# Patient Record
Sex: FEMALE | Race: WHITE | NOT HISPANIC OR LATINO | Employment: UNEMPLOYED | URBAN - METROPOLITAN AREA
[De-identification: names, ages, dates, MRNs, and addresses within clinical notes are randomized per-mention and may not be internally consistent; named-entity substitution may affect disease eponyms.]

---

## 2017-11-27 RX ORDER — OXYCODONE AND ACETAMINOPHEN 10; 325 MG/1; MG/1
1 TABLET ORAL EVERY 6 HOURS PRN
COMMUNITY
End: 2018-10-31

## 2017-11-27 RX ORDER — PAROXETINE HYDROCHLORIDE HEMIHYDRATE 37.5 MG/1
37.5 TABLET, FILM COATED, EXTENDED RELEASE ORAL EVERY MORNING
COMMUNITY
End: 2018-10-31

## 2017-11-27 RX ORDER — DIAZEPAM 5 MG/1
5 TABLET ORAL 2 TIMES DAILY PRN
COMMUNITY

## 2017-11-27 RX ORDER — CHOLECALCIFEROL (VITAMIN D3) 125 MCG
CAPSULE ORAL
COMMUNITY

## 2017-11-27 RX ORDER — FESOTERODINE FUMARATE 8 MG/1
TABLET, EXTENDED RELEASE ORAL
COMMUNITY
End: 2021-07-01

## 2017-11-28 ENCOUNTER — OFFICE VISIT (OUTPATIENT)
Dept: NEUROSURGERY | Facility: CLINIC | Age: 62
End: 2017-11-28

## 2017-11-28 VITALS — BODY MASS INDEX: 18.48 KG/M2 | WEIGHT: 115 LBS | TEMPERATURE: 98.7 F | HEIGHT: 66 IN

## 2017-11-28 DIAGNOSIS — M51.34 DEGENERATIVE DISC DISEASE, THORACIC: ICD-10-CM

## 2017-11-28 DIAGNOSIS — M51.36 DEGENERATIVE DISC DISEASE, LUMBAR: Primary | ICD-10-CM

## 2017-11-28 PROCEDURE — 99203 OFFICE O/P NEW LOW 30 MIN: CPT | Performed by: NEUROLOGICAL SURGERY

## 2017-11-28 NOTE — PROGRESS NOTES
Jessica Montoya  1955  [id number]      Chief Complaint   Patient presents with   • Lower back pain       HISTORY OF PRESENT ILLNESS:  She is a 62-year-old female who presents with a very complex neurological history.  She has a history of multiple sclerosis and has been under the care of Jun Dodson and Dr. Espino.  Concurrently she has a history of degenerative disc disease and chronic pain as a consequence.  I saw her approximately 10 years ago with a diagnosis of degenerative disc disease L5-S1 with a minor spondylolisthesis.  I've not seen her since that time.  Her pain has gotten progressively worse.  It radiates from her thoracic region distally into the lumbar area.  I am unable to elicit a history that would suggest nerve root or spinal cord compression.  She does have some issues with spasm related to her multiple sclerosis, however.    Concurrently she has lost considerable amount of weight, has become exceedingly deconditioned and increasingly depressed with suicidal ideation.  Because of the increase of her pain she is referred for neurosurgical consultation.  She has not been to pain management.     Past Medical History:   Diagnosis Date   • Anxiety    • Arthritis    • Ataxic gait    • Avitaminosis    • Carpal tunnel syndrome    • Depression    • Hyperlipidemia    • Low back pain    • Multiple falls    • Multiple sclerosis    • Neurogenic bladder    • Pyelonephritis    • Spastic gait    • Suicidal ideation    • Urinary incontinence        History reviewed. No pertinent surgical history.    Family History   Problem Relation Age of Onset   • Arthritis Other    • Hyperlipidemia Other    • Hypertension Other    • Depression Other    • Diabetes Other        Social History     Social History   • Marital status:      Spouse name: N/A   • Number of children: N/A   • Years of education: N/A     Occupational History   • Not on file.     Social History Main Topics   • Smoking status: Current Every Day  Smoker     Packs/day: 1.00     Types: Cigarettes   • Smokeless tobacco: Not on file   • Alcohol use Yes   • Drug use: No   • Sexual activity: Defer     Other Topics Concern   • Not on file     Social History Narrative       Allergies   Allergen Reactions   • Codeine          Current Outpatient Prescriptions:   •  Cholecalciferol (VITAMIN D3) 2000 units tablet, Take  by mouth., Disp: , Rfl:   •  diazePAM (VALIUM) 5 MG tablet, Take 5 mg by mouth 2 (Two) Times a Day As Needed for Anxiety., Disp: , Rfl:   •  fesoterodine fumarate (TOVIAZ ER) 8 MG tablet sustained-release 24 hour tablet, Take  by mouth Daily., Disp: , Rfl:   •  oxyCODONE-acetaminophen (PERCOCET)  MG per tablet, Take 1 tablet by mouth Every 6 (Six) Hours As Needed for Moderate Pain ., Disp: , Rfl:   •  PARoxetine CR (PAXIL-CR) 37.5 MG 24 hr tablet, Take 37.5 mg by mouth Every Morning., Disp: , Rfl:     Review of Systems   Constitutional: Positive for fatigue. Negative for activity change, appetite change, chills, diaphoresis, fever and unexpected weight change.   HENT: Negative for congestion, dental problem, drooling, ear discharge, ear pain, facial swelling, hearing loss, mouth sores, nosebleeds, postnasal drip, rhinorrhea, sinus pressure, sneezing, sore throat, tinnitus, trouble swallowing and voice change.    Eyes: Negative for photophobia, pain, discharge, redness, itching and visual disturbance.   Respiratory: Negative for apnea, cough, choking, chest tightness, shortness of breath, wheezing and stridor.    Cardiovascular: Negative for chest pain, palpitations and leg swelling.   Gastrointestinal: Negative for abdominal distention, abdominal pain, anal bleeding, blood in stool, constipation, diarrhea, nausea, rectal pain and vomiting.   Endocrine: Positive for polydipsia. Negative for cold intolerance, heat intolerance, polyphagia and polyuria.   Genitourinary: Positive for urgency. Negative for decreased urine volume, difficulty urinating,  "dysuria, enuresis, flank pain, frequency, genital sores and hematuria.   Musculoskeletal: Positive for arthralgias, back pain and gait problem. Negative for joint swelling, myalgias, neck pain and neck stiffness.   Skin: Negative for color change, pallor, rash and wound.   Allergic/Immunologic: Negative for environmental allergies, food allergies and immunocompromised state.   Neurological: Negative for dizziness, tremors, seizures, syncope, facial asymmetry, speech difficulty, weakness, light-headedness, numbness and headaches.   Hematological: Negative for adenopathy. Does not bruise/bleed easily.   Psychiatric/Behavioral: Positive for agitation, behavioral problems and dysphoric mood. Negative for confusion, decreased concentration, hallucinations, self-injury, sleep disturbance and suicidal ideas. The patient is nervous/anxious. The patient is not hyperactive.    All other systems reviewed and are negative.      Vitals:    11/28/17 0923   Temp: 98.7 °F (37.1 °C)   TempSrc: Temporal Artery    Weight: 115 lb (52.2 kg)   Height: 66\" (167.6 cm)       Neurological Examination:    Mental status/speech: The patient is alert and oriented.  Speech is clear without aphysia or dysarthria.  No overt cognitive deficits.    Cranial nerve examination:    Olfaction: Smell is intact.  Vision: Vision is intact without visual field abnormalities.  Funduscopic examination is normal.  No pupillary irregularity.  Ocular motor examination: The extraocular muscles are intact.  There is no diplopia.  The pupil is round and reactive to both light and accommodation.  There is no nystagmus.  Facial movement/sensation: There is no facial weakness.  Sensation is intact in the first, second, and third divisions of the trigeminal nerve.  The corneal reflex is intact.  Auditory: Hearing is intact to finger rub bilaterally.  Cranial nerves IX, X, XI, XII: Phonation is normal.  No dysphagia.  Tongue is protruded in the midline without atrophy.  " The gag reflex is intact.  Shoulder shrug is normal.    Musculoligamentous ligamentous examination: This is a tendon reflexes are hypoactive both in the upper and lower extremity.  I'm unable to elicit clonus.  She has a mild Babinski bilaterally.  Her strength with formal testing is intact.  She has spotty sensory loss.  She walks in a flexed position.           Medical Decision Making:     Diagnostic Data Set:  Lumbar MRI shows degenerative disc disease with a grade 1 spondylolisthesis L5-S1.  There is no high-grade spinal stenosis or neural foraminal closure however.  Thoracic MRI shows mild disc degeneration but no evidence of stenosis.      Assessment:  Degenerative disc disease, multiple sclerosis, marked depression          Recommendations:  She does not have a neurosurgical abnormality.  In my opinion I think the issues that she has are influenced considerably by a profound depression.  She has not been to physical therapy and is generally deconditioned.  Chronic pain is a issue as well.  I have recommended that she see Dr. Barbara Gabriel for psychological evaluation and therapy.  Her depression is long-lived and she will need considerable intensive therapy in order for this to improve, in my opinion.  I've asked her see Dr. mena for consideration of a dorsal column stimulator.  Obviously her depression needs to be at least identified before any device for pain management would be anticipated to give her the relief that she is seeking.  I have also sent her to physical therapy intensive physical therapy may well help considerably.        I greatly appreciate the opportunity to see and evaluate this individual.  If you have questions or concerns regarding issues that I may have overlooked please call me at any time: [phone].  Jerad Dhaliwal M.D.  [facility]  [address] [place], [place] [id number]

## 2017-11-28 NOTE — PATIENT INSTRUCTIONS
call Dr. Dhaliwal on a Monday or Tuesday with an update.   Ask for Ade,  and leave a message for  Dr. Dhaliwal.  He will call you back at the end of the day as soon as he can.     [phone]

## 2017-11-29 ENCOUNTER — TELEPHONE (OUTPATIENT)
Dept: PAIN MEDICINE | Facility: CLINIC | Age: 62
End: 2017-11-29

## 2017-11-29 DIAGNOSIS — G89.4 CHRONIC PAIN SYNDROME: Primary | ICD-10-CM

## 2018-10-31 ENCOUNTER — SPECIALTY PHARMACY (OUTPATIENT)
Dept: ONCOLOGY | Facility: HOSPITAL | Age: 63
End: 2018-10-31

## 2018-10-31 ENCOUNTER — OFFICE VISIT (OUTPATIENT)
Dept: NEUROLOGY | Facility: CLINIC | Age: 63
End: 2018-10-31

## 2018-10-31 ENCOUNTER — LAB (OUTPATIENT)
Dept: LAB | Facility: HOSPITAL | Age: 63
End: 2018-10-31

## 2018-10-31 VITALS
SYSTOLIC BLOOD PRESSURE: 128 MMHG | DIASTOLIC BLOOD PRESSURE: 68 MMHG | HEIGHT: 66 IN | BODY MASS INDEX: 21.02 KG/M2 | HEART RATE: 74 BPM | RESPIRATION RATE: 16 BRPM | WEIGHT: 130.8 LBS | OXYGEN SATURATION: 98 %

## 2018-10-31 DIAGNOSIS — N31.9 NEUROGENIC BLADDER: ICD-10-CM

## 2018-10-31 DIAGNOSIS — F33.41 RECURRENT MAJOR DEPRESSIVE DISORDER, IN PARTIAL REMISSION (HCC): ICD-10-CM

## 2018-10-31 DIAGNOSIS — G35 MULTIPLE SCLEROSIS (HCC): Primary | ICD-10-CM

## 2018-10-31 DIAGNOSIS — G35 MULTIPLE SCLEROSIS (HCC): ICD-10-CM

## 2018-10-31 LAB
ALBUMIN SERPL-MCNC: 4.36 G/DL (ref 3.2–4.8)
ALBUMIN/GLOB SERPL: 2.1 G/DL (ref 1.5–2.5)
ALP SERPL-CCNC: 60 U/L (ref 25–100)
ALT SERPL W P-5'-P-CCNC: 15 U/L (ref 7–40)
ANION GAP SERPL CALCULATED.3IONS-SCNC: 4 MMOL/L (ref 3–11)
AST SERPL-CCNC: 15 U/L (ref 0–33)
BASOPHILS # BLD AUTO: 0.07 10*3/MM3 (ref 0–0.2)
BASOPHILS NFR BLD AUTO: 1.1 % (ref 0–1)
BILIRUB SERPL-MCNC: 0.4 MG/DL (ref 0.3–1.2)
BUN BLD-MCNC: 15 MG/DL (ref 9–23)
BUN/CREAT SERPL: 22.1 (ref 7–25)
CALCIUM SPEC-SCNC: 9.6 MG/DL (ref 8.7–10.4)
CHLORIDE SERPL-SCNC: 106 MMOL/L (ref 99–109)
CO2 SERPL-SCNC: 30 MMOL/L (ref 20–31)
CREAT BLD-MCNC: 0.68 MG/DL (ref 0.6–1.3)
DEPRECATED RDW RBC AUTO: 43.6 FL (ref 37–54)
EOSINOPHIL # BLD AUTO: 0.79 10*3/MM3 (ref 0–0.3)
EOSINOPHIL NFR BLD AUTO: 12.7 % (ref 0–3)
ERYTHROCYTE [DISTWIDTH] IN BLOOD BY AUTOMATED COUNT: 12.8 % (ref 11.3–14.5)
GFR SERPL CREATININE-BSD FRML MDRD: 88 ML/MIN/1.73
GLOBULIN UR ELPH-MCNC: 2 GM/DL
GLUCOSE BLD-MCNC: 114 MG/DL (ref 70–100)
HAV IGM SERPL QL IA: NORMAL
HBV CORE IGM SERPL QL IA: NORMAL
HBV SURFACE AG SERPL QL IA: NORMAL
HCT VFR BLD AUTO: 43 % (ref 34.5–44)
HCV AB SER DONR QL: NORMAL
HGB BLD-MCNC: 13.9 G/DL (ref 11.5–15.5)
HIV1+2 AB SER QL: NORMAL
IMM GRANULOCYTES # BLD: 0.02 10*3/MM3 (ref 0–0.03)
IMM GRANULOCYTES NFR BLD: 0.3 % (ref 0–0.6)
LYMPHOCYTES # BLD AUTO: 1.25 10*3/MM3 (ref 0.6–4.8)
LYMPHOCYTES NFR BLD AUTO: 20.1 % (ref 24–44)
MCH RBC QN AUTO: 30.3 PG (ref 27–31)
MCHC RBC AUTO-ENTMCNC: 32.3 G/DL (ref 32–36)
MCV RBC AUTO: 93.9 FL (ref 80–99)
MONOCYTES # BLD AUTO: 0.34 10*3/MM3 (ref 0–1)
MONOCYTES NFR BLD AUTO: 5.5 % (ref 0–12)
NEUTROPHILS # BLD AUTO: 3.77 10*3/MM3 (ref 1.5–8.3)
NEUTROPHILS NFR BLD AUTO: 60.6 % (ref 41–71)
PLATELET # BLD AUTO: 335 10*3/MM3 (ref 150–450)
PMV BLD AUTO: 10.6 FL (ref 6–12)
POTASSIUM BLD-SCNC: 4.3 MMOL/L (ref 3.5–5.5)
PROT SERPL-MCNC: 6.4 G/DL (ref 5.7–8.2)
RBC # BLD AUTO: 4.58 10*6/MM3 (ref 3.89–5.14)
SODIUM BLD-SCNC: 140 MMOL/L (ref 132–146)
WBC NRBC COR # BLD: 6.22 10*3/MM3 (ref 3.5–10.8)

## 2018-10-31 PROCEDURE — 86787 VARICELLA-ZOSTER ANTIBODY: CPT

## 2018-10-31 PROCEDURE — 36415 COLL VENOUS BLD VENIPUNCTURE: CPT

## 2018-10-31 PROCEDURE — 85025 COMPLETE CBC W/AUTO DIFF WBC: CPT

## 2018-10-31 PROCEDURE — G0432 EIA HIV-1/HIV-2 SCREEN: HCPCS

## 2018-10-31 PROCEDURE — 80074 ACUTE HEPATITIS PANEL: CPT

## 2018-10-31 PROCEDURE — 80053 COMPREHEN METABOLIC PANEL: CPT

## 2018-10-31 PROCEDURE — 86480 TB TEST CELL IMMUN MEASURE: CPT

## 2018-10-31 PROCEDURE — 99245 OFF/OP CONSLTJ NEW/EST HI 55: CPT | Performed by: PSYCHIATRY & NEUROLOGY

## 2018-10-31 RX ORDER — DALFAMPRIDINE 10 MG/1
10 TABLET, FILM COATED, EXTENDED RELEASE ORAL 2 TIMES DAILY
Qty: 60 TABLET | Refills: 11 | Status: SHIPPED | OUTPATIENT
Start: 2018-10-31 | End: 2018-11-26

## 2018-10-31 RX ORDER — DULOXETIN HYDROCHLORIDE 60 MG/1
CAPSULE, DELAYED RELEASE ORAL
COMMUNITY
Start: 2018-10-08 | End: 2019-10-29

## 2018-10-31 NOTE — PROGRESS NOTES
Subjective   Patient ID: Jessica Montoya is a 63 y.o. female     Chief Complaint   Patient presents with   • Multiple Sclerosis     needs new MD, Dr. Dodson left        History of Present Illness    63 y.o. female referred by Tricia Ferris for RRMS.  Dx in 2010 after having back pain for 5 years.  Dr Dodson started on Tysabri and continued for approximately 2 years.  After stopping Tysabri, axial spine pain neck to spine.  Severe intensity and treated by Dr Vernon.  Legs ache and hurt.      Fatigue is severe.      First sx in 1990's with N/T in hands.      Pain intensified significantly after stopping Tysabri.     Weakness in sarah LE.  Needs walker and WC in public.    Last MRI Brain November 2017.      T25FW 12 sec     Reviewed medical records:    Pt with LBP, spastic paraparesis, MDD, neurogenic bladder.  Uses a walker and WC.      Previously followed by Dr Dodson and Dr Espino.    DMD:  Tysabri stopped 2016  JCV 0.08 5/22/15  T25FW 9.2   Past Medical History:   Diagnosis Date   • Anxiety    • Arthritis    • Ataxic gait    • Avitaminosis    • Carpal tunnel syndrome    • Depression    • Hyperlipidemia    • Low back pain    • Multiple falls    • Multiple sclerosis (CMS/HCC)    • Neurogenic bladder    • Pyelonephritis    • Spastic gait    • Suicidal ideation    • Urinary incontinence      Family History   Problem Relation Age of Onset   • Arthritis Other    • Hyperlipidemia Other    • Hypertension Other    • Depression Other    • Diabetes Other      Social History     Social History   • Marital status:      Social History Main Topics   • Smoking status: Current Some Day Smoker     Packs/day: 1.00     Types: Cigarettes   • Smokeless tobacco: Never Used   • Alcohol use Yes   • Drug use: No   • Sexual activity: Defer     Other Topics Concern   • Not on file       Review of Systems   Constitutional: Positive for fatigue. Negative for activity change and unexpected weight change.   HENT: Negative for tinnitus and  "trouble swallowing.    Eyes: Negative for photophobia and visual disturbance.   Respiratory: Negative for apnea, cough and choking.    Cardiovascular: Negative for leg swelling.   Gastrointestinal: Negative for nausea and vomiting.   Endocrine: Negative for cold intolerance and heat intolerance.   Genitourinary: Positive for difficulty urinating, frequency and urgency. Negative for menstrual problem.   Musculoskeletal: Positive for gait problem. Negative for back pain, myalgias and neck pain.   Skin: Negative for color change and rash.   Allergic/Immunologic: Negative for immunocompromised state.   Neurological: Positive for weakness and numbness. Negative for dizziness, tremors, seizures, syncope, facial asymmetry, speech difficulty, light-headedness and headaches.   Hematological: Negative for adenopathy. Does not bruise/bleed easily.   Psychiatric/Behavioral: Positive for decreased concentration and dysphoric mood. Negative for behavioral problems, confusion, hallucinations and sleep disturbance. The patient is nervous/anxious.        Objective     Vitals:    10/31/18 0919   BP: 128/68   Pulse: 74   Resp: 16   SpO2: 98%   Weight: 59.3 kg (130 lb 12.8 oz)   Height: 167.6 cm (66\")       Neurologic Exam     Mental Status   Oriented to person, place, and time.   Registration: recalls 3 of 3 objects. Recall at 5 minutes: recalls 3 of 3 objects. Follows 3 step commands.   Attention: normal. Concentration: normal.   Speech: speech is normal   Level of consciousness: alert  Knowledge: good and consistent with education.   Able to name object. Able to read. Able to repeat. Able to write. Normal comprehension.     Cranial Nerves     CN II   Visual fields full to confrontation.   Visual acuity: normal  Right visual field deficit: none  Left visual field deficit: none     CN III, IV, VI   Pupils are equal, round, and reactive to light.  Extraocular motions are normal.   Right pupil: Shape: regular. Reactivity: brisk. " Consensual response: intact.   Left pupil: Shape: regular. Reactivity: brisk. Consensual response: intact.   Nystagmus: none   Diplopia: none  Ophthalmoparesis: none  Upgaze: normal  Downgaze: normal  Conjugate gaze: present  Vestibulo-ocular reflex: present    CN V   Facial sensation intact.   Right corneal reflex: normal  Left corneal reflex: normal    CN VII   Right facial weakness: none  Left facial weakness: none    CN VIII   Hearing: intact    CN IX, X   Palate: symmetric  Right gag reflex: normal  Left gag reflex: normal    CN XI   Right sternocleidomastoid strength: normal  Left sternocleidomastoid strength: normal    CN XII   Tongue: not atrophic  Fasciculations: absent  Tongue deviation: none    Motor Exam   Muscle bulk: normal  Overall muscle tone: normal  Right arm tone: normal  Left arm tone: normal  Right leg tone: normal  Left leg tone: normal    Strength   Strength 5/5 throughout.     Sensory Exam   Light touch normal.   Vibration normal.   Proprioception normal.   Pinprick normal.     Gait, Coordination, and Reflexes     Gait  Gait: normal    Coordination   Romberg: negative  Finger to nose coordination: normal  Heel to shin coordination: normal  Tandem walking coordination: normal    Tremor   Resting tremor: absent  Intention tremor: absent  Action tremor: absent    Reflexes   Reflexes 2+ except as noted.       Physical Exam   Constitutional: She is oriented to person, place, and time. Vital signs are normal. She appears well-developed and well-nourished.   HENT:   Head: Normocephalic and atraumatic.   Eyes: Pupils are equal, round, and reactive to light. EOM and lids are normal.   Fundoscopic exam:       The right eye shows no exudate, no hemorrhage and no papilledema. The right eye shows venous pulsations.        The left eye shows no exudate, no hemorrhage and no papilledema. The left eye shows venous pulsations.   Neck: Normal range of motion and phonation normal. Normal carotid pulses present.  Carotid bruit is not present. No thyroid mass and no thyromegaly present.   Cardiovascular: Normal rate, regular rhythm and normal heart sounds.    Pulmonary/Chest: Effort normal.   Neurological: She is oriented to person, place, and time. She has normal strength. She has a normal Finger-Nose-Finger Test, a normal Heel to Shin Test, a normal Romberg Test and a normal Tandem Gait Test. Gait normal.   Skin: Skin is warm and dry.   Psychiatric: She has a normal mood and affect. Her speech is normal.   Nursing note and vitals reviewed.      No results found for any previous visit.         Assessment/Plan     Problem List Items Addressed This Visit        Nervous and Auditory    Multiple sclerosis (CMS/HCC) - Primary    Current Assessment & Plan     Worsening sx off DMD    MRI Brain     JCV ab, CBC, CMP    Discussed possible treatments of Ocrevus, Tysabri and Lemtrada     Start Ampyra                Relevant Orders    MRI Brain With & Without Contrast    Hepatitis Panel, Acute    Varicella Zoster Antibody, IgG    HIV-1 & HIV-2 Antibodies    CBC & Differential    Comprehensive Metabolic Panel    QuantiFERON TB Gold       Genitourinary    Neurogenic bladder    Current Assessment & Plan     Continue Toviaz             Other    Recurrent major depressive disorder, in partial remission (CMS/HCC)    Current Assessment & Plan     Psychological condition is unchanged.  Continue current treatment regimen.  Psychological condition  will be reassessed at the next regular appointment.         Relevant Medications    diazePAM (VALIUM) 5 MG tablet    DULoxetine (CYMBALTA) 60 MG capsule             No Follow-up on file.

## 2018-10-31 NOTE — ASSESSMENT & PLAN NOTE
Worsening sx off DMD    MRI Brain     JCV ab, CBC, CMP    Discussed possible treatments of Ocrevus, Tysabri and Lemtrada     Start Ampyra

## 2018-11-01 LAB — VZV IGG SER IA-ACNC: 945 INDEX

## 2018-11-02 ENCOUNTER — SPECIALTY PHARMACY (OUTPATIENT)
Dept: ONCOLOGY | Facility: HOSPITAL | Age: 63
End: 2018-11-02

## 2018-11-02 NOTE — PROGRESS NOTES
Oral Neurology Medication Teaching        Patient Name/:     Jessica Montoya   1955  Oral Neurology Medication Regimen:  Dalfampridine XR 10mg PO BID  Date Started Medication: pending medication acquisition           Initial Teaching Follow Up Comments      Safety      Storage instructions (away from children; away from heat/cold, sunlight, or moisture)       “How are you storing your medications?”, reminders on storage, proper handling (away from children, managing waste, etc.), disposal of medication with D/C or dosage change     Patient counseled on appropriate storage of medication. Store at room temp, away from pets and children. Pt verbalized understanding.       Adherence       patient and/or caregiver on how to take medication, take with/without food, assess their adherence potential, stress importance of adherence, ways to manage adherence (pill boxes, phone reminders, calendars), what to do if miss a dose    “How are you taking your medication?” “How are you remembering to take your medication?”, “How many doses have you missed?”, determine reasons for non-adherence (not remembering, side effects, etc), ways to improve, overadherence? Remind patient of ways to improve/maintain adherence    Reviewed plan for Dalfampridine XR 10mg (1 tablet) by mouth twice a day (doses should be 12 hours apart). Reviewed plan for missed doses. Pt voiced understanding. Discussed importance of compliance. Script processed at [facility] retail and shipment is in process.       Side Effects/Adverse Reactions       patient on potential side effects, s/s, ways to manage, when to call MD/seek help       Determine if patient experiencing side effects, ways to manage  Discussed potential side effects including but not limited to: N/V, headache, abdominal pain, insomnia, and urinary tract infections. Discussed potential serious side effects of seizures and anaphylactic reactions.  Pt verbalized understanding.       Miscellaneous      Food interactions, DDIs, financial issues Determine if patient started any new medications since being placed on oral chemo (analyze for DDI) No DDIs identified with planned medication list and Dalfampridine.       Additional Notes: Discussed aforementioned material with patient by phone. All questions and concerns addressed. Patient instructed to call office should additional questions arise.

## 2018-11-11 LAB
QUANTIFERON CRITERIA: NORMAL
QUANTIFERON MITOGEN VALUE: >10 IU/ML
QUANTIFERON NIL VALUE: 0.04 IU/ML
QUANTIFERON TB1 AG VALUE: 0.03 IU/ML
QUANTIFERON TB2 AG VALUE: 0.04 IU/ML
QUANTIFERON-TB GOLD PLUS: NEGATIVE

## 2018-11-21 ENCOUNTER — SPECIALTY PHARMACY (OUTPATIENT)
Dept: ONCOLOGY | Facility: HOSPITAL | Age: 63
End: 2018-11-21

## 2018-11-21 NOTE — PROGRESS NOTES
Oral Neurology Medication Teaching        Patient Name/:     Jessica Montoya   1955  Oral Neurology Medication Regimen:  Dalfampridine XR 10mg PO BID  Date Started Medication: 2018               Initial Teaching Follow Up Comments      Safety      Storage instructions (away from children; away from heat/cold, sunlight, or moisture)       “How are you storing your medications?”, reminders on storage, proper handling (away from children, managing waste, etc.), disposal of medication with D/C or dosage change     Pt reports appropriate storage and handling.      Adherence       patient and/or caregiver on how to take medication, take with/without food, assess their adherence potential, stress importance of adherence, ways to manage adherence (pill boxes, phone reminders, calendars), what to do if miss a dose    “How are you taking your medication?” “How are you remembering to take your medication?”, “How many doses have you missed?”, determine reasons for non-adherence (not remembering, side effects, etc), ways to improve, overadherence? Remind patient of ways to improve/maintain adherence    Confirmed dose of  Dalfampridine XR 10mg (1 tablet) by mouth twice a day. Pt received medication through [employer] retail. Medication to be shipped to patient.       Side Effects/Adverse Reactions       patient on potential side effects, s/s, ways to manage, when to call MD/seek help       Determine if patient experiencing side effects, ways to manage  Pt reports no adverse effects      Miscellaneous      Food interactions, DDIs, financial issues Determine if patient started any new medications (analyze for DDI)       Additional Notes: Discussed aforementioned material with patient by phone. All questions and concerns addressed. Patient provided with my contact information and instructed to call if additional questions should arise.

## 2018-11-26 ENCOUNTER — SPECIALTY PHARMACY (OUTPATIENT)
Dept: ONCOLOGY | Facility: HOSPITAL | Age: 63
End: 2018-11-26

## 2018-11-26 RX ORDER — DALFAMPRIDINE 10 MG/1
10 TABLET, FILM COATED, EXTENDED RELEASE ORAL 2 TIMES DAILY
Qty: 60 TABLET | Refills: 11 | Status: SHIPPED | OUTPATIENT
Start: 2018-11-26 | End: 2019-10-29 | Stop reason: SINTOL

## 2018-11-26 RX ORDER — DALFAMPRIDINE 10 MG/1
10 TABLET, FILM COATED, EXTENDED RELEASE ORAL 2 TIMES DAILY
Qty: 180 TABLET | Refills: 3 | Status: SHIPPED | OUTPATIENT
Start: 2018-11-26 | End: 2018-11-26

## 2018-12-13 ENCOUNTER — TELEPHONE (OUTPATIENT)
Dept: NEUROLOGY | Facility: CLINIC | Age: 63
End: 2018-12-13

## 2019-09-24 ENCOUNTER — TELEPHONE (OUTPATIENT)
Dept: NEUROLOGY | Facility: CLINIC | Age: 64
End: 2019-09-24

## 2019-10-29 ENCOUNTER — OFFICE VISIT (OUTPATIENT)
Dept: NEUROLOGY | Facility: CLINIC | Age: 64
End: 2019-10-29

## 2019-10-29 VITALS
SYSTOLIC BLOOD PRESSURE: 152 MMHG | HEIGHT: 66 IN | HEART RATE: 84 BPM | DIASTOLIC BLOOD PRESSURE: 86 MMHG | OXYGEN SATURATION: 100 % | BODY MASS INDEX: 22.5 KG/M2 | RESPIRATION RATE: 16 BRPM | WEIGHT: 140 LBS

## 2019-10-29 DIAGNOSIS — G35 MULTIPLE SCLEROSIS (HCC): Primary | ICD-10-CM

## 2019-10-29 DIAGNOSIS — F33.41 RECURRENT MAJOR DEPRESSIVE DISORDER, IN PARTIAL REMISSION (HCC): ICD-10-CM

## 2019-10-29 PROCEDURE — 99213 OFFICE O/P EST LOW 20 MIN: CPT | Performed by: PSYCHIATRY & NEUROLOGY

## 2019-10-29 RX ORDER — BACLOFEN 10 MG/1
TABLET ORAL
COMMUNITY
Start: 2019-10-06 | End: 2021-07-01

## 2019-10-29 RX ORDER — OMEPRAZOLE 40 MG/1
CAPSULE, DELAYED RELEASE ORAL
COMMUNITY
Start: 2019-10-13 | End: 2021-07-01

## 2019-10-29 NOTE — ASSESSMENT & PLAN NOTE
Worsening gait and now confined to WC    Severe spine and LE pain    MRI Brain/Cervical    PT/OT

## 2019-10-29 NOTE — PROGRESS NOTES
Subjective   Patient ID: Jessica Montoya is a 64 y.o. female     Chief Complaint   Patient presents with   • Multiple Sclerosis        History of Present Illness    64 y.o. female returns in follow up for RRMS.  Last visit on 10/31/18 ordered MRI Brain, labs, started Ampyra.     Pt did not have MRI Brain.  Stopped Ampyra due to insomnia.      WC bound for over 6 months.  Severe pain in spine down into legs.          Problem history:    Dx in 2010 after having back pain for 5 years.  Dr Dodson started on Tysabri and continued for approximately 2 years.  After stopping Tysabri, axial spine pain neck to spine.  Severe intensity and treated by Dr Vernon.  Legs ache and hurt.      Fatigue is severe.      First sx in 1990's with N/T in hands.      Pain intensified significantly after stopping Tysabri.     Weakness in sarah LE.  Needs walker and WC in public.    Last MRI Brain November 2017.      T25FW 12 sec     Reviewed medical records:    Pt with LBP, spastic paraparesis, MDD, neurogenic bladder.  Uses a walker and WC.      Previously followed by Dr Dodson and Dr Espino.    DMD:  Tysabri stopped 2016  JCV 0.08 5/22/15  T25FW 9.2   Past Medical History:   Diagnosis Date   • Anxiety    • Arthritis    • Ataxic gait    • Avitaminosis    • Carpal tunnel syndrome    • Depression    • Hyperlipidemia    • Low back pain    • Multiple falls    • Multiple sclerosis (CMS/HCC)    • Neurogenic bladder    • Pyelonephritis    • Spastic gait    • Suicidal ideation    • Urinary incontinence      Family History   Problem Relation Age of Onset   • Arthritis Other    • Hyperlipidemia Other    • Hypertension Other    • Depression Other    • Diabetes Other      Social History     Socioeconomic History   • Marital status:      Spouse name: Not on file   • Number of children: Not on file   • Years of education: Not on file   • Highest education level: Not on file   Tobacco Use   • Smoking status: Current Some Day Smoker     Packs/day: 1.00  "    Types: Cigarettes   • Smokeless tobacco: Never Used   Substance and Sexual Activity   • Alcohol use: Yes   • Drug use: No   • Sexual activity: Defer       Review of Systems   Constitutional: Positive for fatigue. Negative for activity change and unexpected weight change.   HENT: Negative for tinnitus and trouble swallowing.    Eyes: Negative for photophobia and visual disturbance.   Respiratory: Negative for apnea and choking.    Cardiovascular: Negative for leg swelling.   Endocrine: Negative for cold intolerance and heat intolerance.   Genitourinary: Positive for difficulty urinating, frequency and urgency. Negative for menstrual problem.   Musculoskeletal: Positive for gait problem. Negative for back pain.   Skin: Negative for color change.   Allergic/Immunologic: Negative for immunocompromised state.   Neurological: Positive for weakness. Negative for dizziness, tremors, seizures, syncope, facial asymmetry, speech difficulty and light-headedness.   Hematological: Negative for adenopathy. Does not bruise/bleed easily.   Psychiatric/Behavioral: Positive for decreased concentration and dysphoric mood. Negative for behavioral problems, confusion, hallucinations and sleep disturbance. The patient is nervous/anxious.        Objective     Vitals:    10/29/19 1313   BP: 152/86   Pulse: 84   Resp: 16   SpO2: 100%   Weight: 63.5 kg (140 lb)   Height: 167.6 cm (66\")       Neurologic Exam     Mental Status   Registration: recalls 3 of 3 objects. Recall at 5 minutes: recalls 3 of 3 objects. Follows 3 step commands.   Attention: decreased. Concentration: decreased.   Level of consciousness: alert  Knowledge: good and consistent with education.   Able to name object. Able to read. Able to repeat. Able to write. Normal comprehension.     Cranial Nerves     CN II   Visual fields full to confrontation.   Visual acuity: normal  Right visual field deficit: none  Left visual field deficit: none     CN III, IV, VI   Right pupil: " Shape: regular. Reactivity: brisk. Consensual response: intact.   Left pupil: Shape: regular. Reactivity: brisk. Consensual response: intact.   Nystagmus: none   Diplopia: none  Ophthalmoparesis: none  Upgaze: normal  Downgaze: normal  Conjugate gaze: present  Vestibulo-ocular reflex: present    CN V   Facial sensation intact.   Right corneal reflex: normal  Left corneal reflex: normal    CN VII   Right facial weakness: none  Left facial weakness: none    CN VIII   Hearing: intact    CN IX, X   Palate: symmetric  Right gag reflex: normal  Left gag reflex: normal    CN XI   Right sternocleidomastoid strength: normal  Left sternocleidomastoid strength: normal    CN XII   Tongue: not atrophic  Fasciculations: absent  Tongue deviation: none    Motor Exam   Muscle bulk: normal  Overall muscle tone: normal  Right arm tone: normal  Left arm tone: normal  Right leg tone: spastic  Left leg tone: spastic    Strength   Right deltoid: 4/5  Left deltoid: 4/5  Right biceps: 4/5  Left biceps: 4/5  Right triceps: 4/5  Left triceps: 4/5  Right wrist flexion: 4/5  Left wrist flexion: 4/5  Right wrist extension: 4/5  Left wrist extension: 4/5  Right interossei: 4/5  Left interossei: 4/5  Right iliopsoas: 3/5  Left iliopsoas: 3/5  Right quadriceps: 3/5  Left quadriceps: 3/5  Right hamstring: 3/5  Left hamstring: 3/5  Right glutei: 3/5  Left glutei: 3/5  Right anterior tibial: 3/5  Left anterior tibial: 3/5  Right posterior tibial: 2/5  Left posterior tibial: 2/5  Right peroneal: 2/5  Left peroneal: 2/5  Right gastroc: 2/5  Left gastroc: 2/5    Sensory Exam   Light touch normal.   Vibration normal.   Proprioception normal.   Pinprick normal.     Gait, Coordination, and Reflexes     Tremor   Resting tremor: absent  Intention tremor: absent  Action tremor: absent    Reflexes   Right brachioradialis: 3+  Left brachioradialis: 3+  Right biceps: 3+  Left biceps: 3+  Right triceps: 3+  Left triceps: 3+  Right patellar: 3+  Left patellar:  3+  Right achilles: 3+  Left achilles: 3+      Physical Exam   Constitutional: She appears well-developed and well-nourished.   Neurological:   Reflex Scores:       Tricep reflexes are 3+ on the right side and 3+ on the left side.       Bicep reflexes are 3+ on the right side and 3+ on the left side.       Brachioradialis reflexes are 3+ on the right side and 3+ on the left side.       Patellar reflexes are 3+ on the right side and 3+ on the left side.       Achilles reflexes are 3+ on the right side and 3+ on the left side.  Nursing note and vitals reviewed.      Lab on 10/31/2018   Component Date Value Ref Range Status   • Hepatitis B Surface Ag 10/31/2018 Non-Reactive  Non-Reactive Final   • Hep A IgM 10/31/2018 Non-Reactive  Non-Reactive Final    Results may be falsely decreased if patient taking Biotin.   • Hep B C IgM 10/31/2018 Non-Reactive  Non-Reactive Final    Results may be falsely decreased if patient taking Biotin.   • Hepatitis C Ab 10/31/2018 Non-Reactive  Non-Reactive Final   • Varicella IgG 10/31/2018 945  Immune >165 index Final                                   Negative          <135                                 Equivocal    135 - 165                                 Positive          >165  A positive result generally indicates exposure to the  pathogen or administration of specific immunoglobulins,  but it is not indication of active infection or stage  of disease.   • Glucose 10/31/2018 114* 70 - 100 mg/dL Final   • BUN 10/31/2018 15  9 - 23 mg/dL Final   • Creatinine 10/31/2018 0.68  0.60 - 1.30 mg/dL Final   • Sodium 10/31/2018 140  132 - 146 mmol/L Final   • Potassium 10/31/2018 4.3  3.5 - 5.5 mmol/L Final   • Chloride 10/31/2018 106  99 - 109 mmol/L Final   • CO2 10/31/2018 30.0  20.0 - 31.0 mmol/L Final   • Calcium 10/31/2018 9.6  8.7 - 10.4 mg/dL Final   • Total Protein 10/31/2018 6.4  5.7 - 8.2 g/dL Final   • Albumin 10/31/2018 4.36  3.20 - 4.80 g/dL Final   • ALT (SGPT) 10/31/2018 15   7 - 40 U/L Final   • AST (SGOT) 10/31/2018 15  0 - 33 U/L Final   • Alkaline Phosphatase 10/31/2018 60  25 - 100 U/L Final   • Total Bilirubin 10/31/2018 0.4  0.3 - 1.2 mg/dL Final   • eGFR Non African Amer 10/31/2018 88  >60 mL/min/1.73 Final   • Globulin 10/31/2018 2.0  gm/dL Final   • A/G Ratio 10/31/2018 2.1  1.5 - 2.5 g/dL Final   • BUN/Creatinine Ratio 10/31/2018 22.1  7.0 - 25.0 Final   • Anion Gap 10/31/2018 4.0  3.0 - 11.0 mmol/L Final   • HIV-1/ HIV-2 10/31/2018 Non-Reactive  Non-Reactive Final   • WBC 10/31/2018 6.22  3.50 - 10.80 10*3/mm3 Final   • RBC 10/31/2018 4.58  3.89 - 5.14 10*6/mm3 Final   • Hemoglobin 10/31/2018 13.9  11.5 - 15.5 g/dL Final   • Hematocrit 10/31/2018 43.0  34.5 - 44.0 % Final   • MCV 10/31/2018 93.9  80.0 - 99.0 fL Final   • MCH 10/31/2018 30.3  27.0 - 31.0 pg Final   • MCHC 10/31/2018 32.3  32.0 - 36.0 g/dL Final   • RDW 10/31/2018 12.8  11.3 - 14.5 % Final   • RDW-SD 10/31/2018 43.6  37.0 - 54.0 fl Final   • MPV 10/31/2018 10.6  6.0 - 12.0 fL Final   • Platelets 10/31/2018 335  150 - 450 10*3/mm3 Final   • Neutrophil % 10/31/2018 60.6  41.0 - 71.0 % Final   • Lymphocyte % 10/31/2018 20.1* 24.0 - 44.0 % Final   • Monocyte % 10/31/2018 5.5  0.0 - 12.0 % Final   • Eosinophil % 10/31/2018 12.7* 0.0 - 3.0 % Final   • Basophil % 10/31/2018 1.1* 0.0 - 1.0 % Final   • Immature Grans % 10/31/2018 0.3  0.0 - 0.6 % Final   • Neutrophils, Absolute 10/31/2018 3.77  1.50 - 8.30 10*3/mm3 Final   • Lymphocytes, Absolute 10/31/2018 1.25  0.60 - 4.80 10*3/mm3 Final   • Monocytes, Absolute 10/31/2018 0.34  0.00 - 1.00 10*3/mm3 Final   • Eosinophils, Absolute 10/31/2018 0.79* 0.00 - 0.30 10*3/mm3 Final   • Basophils, Absolute 10/31/2018 0.07  0.00 - 0.20 10*3/mm3 Final   • Immature Grans, Absolute 10/31/2018 0.02  0.00 - 0.03 10*3/mm3 Final   • QuantiFERON Criteria 10/31/2018 Comment   Final    The QuantiFERON-TB Gold Plus result is determined by subtracting  the Nil value from either TB antigen  (Ag) tube. The mitogen tube  serves as a control for the test.   • QUANTIFERON TB1 AG VALUE 10/31/2018 0.03  IU/mL Final   • QUANTIFERON TB2 AG VALUE 10/31/2018 0.04  IU/mL Final   • QuantiFERON Nil Value 10/31/2018 0.04  IU/mL Final   • QuantiFERON Mitogen Value 10/31/2018 >10.00  IU/mL Final   • QUANTIFERON-TB GOLD PLUS 10/31/2018 Negative  Negative Final    The specimen received for QuantiFERON testing was incubated by the  ordering institution.  Specific procedures outlined in our Directory  of Services and in the package insert for the QuantiFERON Gold  (In Tube) test must be followed to enable for proper stimulation of  cells for the production of interferon gamma.         Assessment/Plan     Problem List Items Addressed This Visit        Nervous and Auditory    Multiple sclerosis (CMS/HCC) - Primary    Current Assessment & Plan     Worsening gait and now confined to WC    Severe spine and LE pain    MRI Brain/Cervical    PT/OT                 Other    Recurrent major depressive disorder, in partial remission (CMS/HCC)    Relevant Medications    diazePAM (VALIUM) 5 MG tablet             No Follow-up on file.

## 2019-10-30 ENCOUNTER — TELEPHONE (OUTPATIENT)
Dept: NEUROLOGY | Facility: CLINIC | Age: 64
End: 2019-10-30

## 2021-08-07 PROCEDURE — 87086 URINE CULTURE/COLONY COUNT: CPT | Performed by: FAMILY MEDICINE

## 2021-08-09 ENCOUNTER — TELEPHONE (OUTPATIENT)
Dept: URGENT CARE | Facility: CLINIC | Age: 66
End: 2021-08-09

## 2022-09-22 PROCEDURE — 87086 URINE CULTURE/COLONY COUNT: CPT | Performed by: PHYSICIAN ASSISTANT

## 2024-07-16 ENCOUNTER — READMISSION MANAGEMENT (OUTPATIENT)
Dept: CALL CENTER | Facility: HOSPITAL | Age: 69
End: 2024-07-16
Payer: MEDICARE

## 2024-07-17 NOTE — OUTREACH NOTE
Prep Survey      Flowsheet Row Responses   [facility] facility patient discharged from? Non-[facility]   Is LACE score < 7 ? Non-[facility] Discharge   Eligibility Not Eligible   What are the reasons patient is not eligible? Other  [Non [facility] provider]   Does the patient have one of the following disease processes/diagnoses(primary or secondary)? Other   Prep survey completed? Yes            TIFFANIE PALMA - Registered Nurse